# Patient Record
Sex: FEMALE | Race: BLACK OR AFRICAN AMERICAN | Employment: FULL TIME | URBAN - METROPOLITAN AREA
[De-identification: names, ages, dates, MRNs, and addresses within clinical notes are randomized per-mention and may not be internally consistent; named-entity substitution may affect disease eponyms.]

---

## 2018-10-18 ENCOUNTER — HOSPITAL ENCOUNTER (INPATIENT)
Age: 28
LOS: 1 days | Discharge: ANOTHER HOSPITAL | DRG: 446 | End: 2018-10-18
Admitting: HOSPITALIST
Payer: SELF-PAY

## 2018-10-18 ENCOUNTER — HOSPITAL ENCOUNTER (INPATIENT)
Age: 28
LOS: 1 days | Discharge: HOME | DRG: 446 | End: 2018-10-18
Attending: FAMILY MEDICINE | Admitting: FAMILY MEDICINE
Payer: SELF-PAY

## 2018-10-18 ENCOUNTER — APPOINTMENT (OUTPATIENT)
Dept: ULTRASOUND IMAGING | Age: 28
DRG: 446 | End: 2018-10-18
Payer: SELF-PAY

## 2018-10-18 VITALS
OXYGEN SATURATION: 96 % | RESPIRATION RATE: 15 BRPM | TEMPERATURE: 98.2 F | DIASTOLIC BLOOD PRESSURE: 68 MMHG | SYSTOLIC BLOOD PRESSURE: 112 MMHG | HEART RATE: 99 BPM

## 2018-10-18 VITALS
TEMPERATURE: 97 F | SYSTOLIC BLOOD PRESSURE: 151 MMHG | RESPIRATION RATE: 22 BRPM | DIASTOLIC BLOOD PRESSURE: 98 MMHG | OXYGEN SATURATION: 99 % | HEART RATE: 80 BPM

## 2018-10-18 DIAGNOSIS — K80.00 ACUTE CHOLECYSTITIS DUE TO BILIARY CALCULUS: Primary | ICD-10-CM

## 2018-10-18 PROBLEM — R10.9 ABDOMINAL PAIN: Status: ACTIVE | Noted: 2018-10-18

## 2018-10-18 LAB
ALBUMIN SERPL-MCNC: 4.1 G/DL (ref 3.5–5)
ALBUMIN/GLOB SERPL: 1.1 {RATIO}
ALP SERPL-CCNC: 193 U/L (ref 50–136)
ALT SERPL-CCNC: 178 U/L (ref 12–65)
ANION GAP SERPL CALC-SCNC: 5 MMOL/L
AST SERPL-CCNC: 285 U/L (ref 15–37)
BACTERIA URNS QL MICRO: NORMAL /HPF
BASOPHILS # BLD: 0 K/UL (ref 0–0.2)
BASOPHILS NFR BLD: 0 % (ref 0–2)
BILIRUB SERPL-MCNC: 2.5 MG/DL (ref 0.2–1.1)
BUN SERPL-MCNC: 10 MG/DL (ref 6–23)
CALCIUM SERPL-MCNC: 8.9 MG/DL (ref 8.3–10.4)
CASTS URNS QL MICRO: NORMAL /LPF
CHLORIDE SERPL-SCNC: 102 MMOL/L (ref 98–107)
CO2 SERPL-SCNC: 29 MMOL/L (ref 21–32)
CREAT SERPL-MCNC: 1.05 MG/DL (ref 0.6–1)
DIFFERENTIAL METHOD BLD: ABNORMAL
EOSINOPHIL # BLD: 0.2 K/UL (ref 0–0.8)
EOSINOPHIL NFR BLD: 1 % (ref 0.5–7.8)
EPI CELLS #/AREA URNS HPF: NORMAL /HPF
ERYTHROCYTE [DISTWIDTH] IN BLOOD BY AUTOMATED COUNT: 13.6 %
GLOBULIN SER CALC-MCNC: 3.8 G/DL (ref 2.3–3.5)
GLUCOSE SERPL-MCNC: 101 MG/DL (ref 65–100)
HCG UR QL: NEGATIVE
HCT VFR BLD AUTO: 41.7 % (ref 35.8–46.3)
HGB BLD-MCNC: 13.3 G/DL (ref 11.7–15.4)
IMM GRANULOCYTES # BLD: 0 K/UL (ref 0–0.5)
IMM GRANULOCYTES NFR BLD AUTO: 0 % (ref 0–5)
LIPASE SERPL-CCNC: 230 U/L (ref 73–393)
LYMPHOCYTES # BLD: 1.4 K/UL (ref 0.5–4.6)
LYMPHOCYTES NFR BLD: 13 % (ref 13–44)
MCH RBC QN AUTO: 27 PG (ref 26.1–32.9)
MCHC RBC AUTO-ENTMCNC: 31.9 G/DL (ref 31.4–35)
MCV RBC AUTO: 84.6 FL (ref 79.6–97.8)
MONOCYTES # BLD: 0.8 K/UL (ref 0.1–1.3)
MONOCYTES NFR BLD: 7 % (ref 4–12)
NEUTS SEG # BLD: 8.3 K/UL (ref 1.7–8.2)
NEUTS SEG NFR BLD: 78 % (ref 43–78)
NRBC # BLD: 0 K/UL (ref 0–0.2)
PLATELET # BLD AUTO: 243 K/UL (ref 150–450)
PMV BLD AUTO: 10.6 FL (ref 9.4–12.3)
POTASSIUM SERPL-SCNC: 3.7 MMOL/L (ref 3.5–5.1)
PROT SERPL-MCNC: 7.9 G/DL
RBC # BLD AUTO: 4.93 M/UL (ref 4.05–5.2)
RBC #/AREA URNS HPF: NORMAL /HPF
SODIUM SERPL-SCNC: 136 MMOL/L (ref 136–145)
WBC # BLD AUTO: 10.6 K/UL (ref 4.3–11.1)
WBC URNS QL MICRO: NORMAL /HPF

## 2018-10-18 PROCEDURE — 83690 ASSAY OF LIPASE: CPT

## 2018-10-18 PROCEDURE — 81025 URINE PREGNANCY TEST: CPT

## 2018-10-18 PROCEDURE — 85025 COMPLETE CBC W/AUTO DIFF WBC: CPT

## 2018-10-18 PROCEDURE — 99283 EMERGENCY DEPT VISIT LOW MDM: CPT

## 2018-10-18 PROCEDURE — 74011000258 HC RX REV CODE- 258

## 2018-10-18 PROCEDURE — 77030020254 HC SOL INJ D5LR LACTATED RINGER

## 2018-10-18 PROCEDURE — C9113 INJ PANTOPRAZOLE SODIUM, VIA: HCPCS | Performed by: HOSPITALIST

## 2018-10-18 PROCEDURE — 81015 MICROSCOPIC EXAM OF URINE: CPT

## 2018-10-18 PROCEDURE — 96375 TX/PRO/DX INJ NEW DRUG ADDON: CPT

## 2018-10-18 PROCEDURE — 65270000029 HC RM PRIVATE

## 2018-10-18 PROCEDURE — 74011000250 HC RX REV CODE- 250: Performed by: HOSPITALIST

## 2018-10-18 PROCEDURE — 80053 COMPREHEN METABOLIC PANEL: CPT

## 2018-10-18 PROCEDURE — 76705 ECHO EXAM OF ABDOMEN: CPT

## 2018-10-18 PROCEDURE — 96365 THER/PROPH/DIAG IV INF INIT: CPT

## 2018-10-18 PROCEDURE — 74011000258 HC RX REV CODE- 258: Performed by: HOSPITALIST

## 2018-10-18 PROCEDURE — 94760 N-INVAS EAR/PLS OXIMETRY 1: CPT

## 2018-10-18 PROCEDURE — 81003 URINALYSIS AUTO W/O SCOPE: CPT

## 2018-10-18 PROCEDURE — 74011250636 HC RX REV CODE- 250/636

## 2018-10-18 PROCEDURE — 74011250636 HC RX REV CODE- 250/636: Performed by: HOSPITALIST

## 2018-10-18 RX ORDER — METOCLOPRAMIDE HYDROCHLORIDE 5 MG/ML
5 INJECTION INTRAMUSCULAR; INTRAVENOUS EVERY 6 HOURS
Status: DISCONTINUED | OUTPATIENT
Start: 2018-10-18 | End: 2018-10-19 | Stop reason: HOSPADM

## 2018-10-18 RX ORDER — ACETAMINOPHEN 325 MG/1
650 TABLET ORAL
Status: CANCELLED | OUTPATIENT
Start: 2018-10-18

## 2018-10-18 RX ORDER — ACETAMINOPHEN 325 MG/1
650 TABLET ORAL
Status: DISCONTINUED | OUTPATIENT
Start: 2018-10-18 | End: 2018-10-18 | Stop reason: HOSPADM

## 2018-10-18 RX ORDER — SODIUM CHLORIDE 0.9 % (FLUSH) 0.9 %
5-10 SYRINGE (ML) INJECTION AS NEEDED
Status: CANCELLED | OUTPATIENT
Start: 2018-10-18

## 2018-10-18 RX ORDER — SODIUM CHLORIDE 0.9 % (FLUSH) 0.9 %
5-10 SYRINGE (ML) INJECTION EVERY 8 HOURS
Status: DISCONTINUED | OUTPATIENT
Start: 2018-10-18 | End: 2018-10-18 | Stop reason: HOSPADM

## 2018-10-18 RX ORDER — ADHESIVE BANDAGE
30 BANDAGE TOPICAL DAILY PRN
Status: DISCONTINUED | OUTPATIENT
Start: 2018-10-18 | End: 2018-10-19 | Stop reason: HOSPADM

## 2018-10-18 RX ORDER — ADHESIVE BANDAGE
30 BANDAGE TOPICAL DAILY PRN
Status: CANCELLED | OUTPATIENT
Start: 2018-10-18

## 2018-10-18 RX ORDER — SODIUM CHLORIDE 0.9 % (FLUSH) 0.9 %
5-10 SYRINGE (ML) INJECTION AS NEEDED
Status: DISCONTINUED | OUTPATIENT
Start: 2018-10-18 | End: 2018-10-18 | Stop reason: HOSPADM

## 2018-10-18 RX ORDER — HYDROCODONE BITARTRATE AND ACETAMINOPHEN 7.5; 325 MG/1; MG/1
1 TABLET ORAL
Status: DISCONTINUED | OUTPATIENT
Start: 2018-10-18 | End: 2018-10-19 | Stop reason: HOSPADM

## 2018-10-18 RX ORDER — SODIUM CHLORIDE 0.9 % (FLUSH) 0.9 %
5-10 SYRINGE (ML) INJECTION EVERY 8 HOURS
Status: CANCELLED | OUTPATIENT
Start: 2018-10-18

## 2018-10-18 RX ORDER — METOCLOPRAMIDE HYDROCHLORIDE 5 MG/ML
5 INJECTION INTRAMUSCULAR; INTRAVENOUS EVERY 6 HOURS
Status: CANCELLED | OUTPATIENT
Start: 2018-10-18

## 2018-10-18 RX ORDER — HYDROMORPHONE HYDROCHLORIDE 2 MG/ML
0.5 INJECTION, SOLUTION INTRAMUSCULAR; INTRAVENOUS; SUBCUTANEOUS
Status: DISCONTINUED | OUTPATIENT
Start: 2018-10-18 | End: 2018-10-18 | Stop reason: HOSPADM

## 2018-10-18 RX ORDER — ONDANSETRON 2 MG/ML
4 INJECTION INTRAMUSCULAR; INTRAVENOUS
Status: DISCONTINUED | OUTPATIENT
Start: 2018-10-18 | End: 2018-10-18 | Stop reason: HOSPADM

## 2018-10-18 RX ORDER — HYDROCODONE BITARTRATE AND ACETAMINOPHEN 7.5; 325 MG/1; MG/1
1 TABLET ORAL
Status: CANCELLED | OUTPATIENT
Start: 2018-10-18

## 2018-10-18 RX ORDER — DEXTROSE, SODIUM CHLORIDE, SODIUM LACTATE, POTASSIUM CHLORIDE, AND CALCIUM CHLORIDE 5; .6; .31; .03; .02 G/100ML; G/100ML; G/100ML; G/100ML; G/100ML
125 INJECTION, SOLUTION INTRAVENOUS CONTINUOUS
Status: CANCELLED | OUTPATIENT
Start: 2018-10-18 | End: 2018-10-20

## 2018-10-18 RX ORDER — HYDROMORPHONE HYDROCHLORIDE 1 MG/ML
0.5 INJECTION, SOLUTION INTRAMUSCULAR; INTRAVENOUS; SUBCUTANEOUS
Status: DISCONTINUED | OUTPATIENT
Start: 2018-10-18 | End: 2018-10-19 | Stop reason: HOSPADM

## 2018-10-18 RX ORDER — NALOXONE HYDROCHLORIDE 0.4 MG/ML
0.4 INJECTION, SOLUTION INTRAMUSCULAR; INTRAVENOUS; SUBCUTANEOUS AS NEEDED
Status: DISCONTINUED | OUTPATIENT
Start: 2018-10-18 | End: 2018-10-18 | Stop reason: HOSPADM

## 2018-10-18 RX ORDER — ONDANSETRON 2 MG/ML
4 INJECTION INTRAMUSCULAR; INTRAVENOUS
Status: CANCELLED | OUTPATIENT
Start: 2018-10-18

## 2018-10-18 RX ORDER — HYDRALAZINE HYDROCHLORIDE 20 MG/ML
20 INJECTION INTRAMUSCULAR; INTRAVENOUS
Status: DISCONTINUED | OUTPATIENT
Start: 2018-10-18 | End: 2018-10-18 | Stop reason: HOSPADM

## 2018-10-18 RX ORDER — ONDANSETRON 2 MG/ML
4 INJECTION INTRAMUSCULAR; INTRAVENOUS
Status: DISCONTINUED | OUTPATIENT
Start: 2018-10-18 | End: 2018-10-19 | Stop reason: HOSPADM

## 2018-10-18 RX ORDER — HYDROCODONE BITARTRATE AND ACETAMINOPHEN 7.5; 325 MG/1; MG/1
1 TABLET ORAL
Qty: 15 TAB | Refills: 0 | Status: SHIPPED | OUTPATIENT
Start: 2018-10-18

## 2018-10-18 RX ORDER — ENOXAPARIN SODIUM 100 MG/ML
40 INJECTION SUBCUTANEOUS EVERY 24 HOURS
Status: DISCONTINUED | OUTPATIENT
Start: 2018-10-18 | End: 2018-10-18 | Stop reason: HOSPADM

## 2018-10-18 RX ORDER — SODIUM CHLORIDE 0.9 % (FLUSH) 0.9 %
5-10 SYRINGE (ML) INJECTION EVERY 8 HOURS
Status: DISCONTINUED | OUTPATIENT
Start: 2018-10-18 | End: 2018-10-19 | Stop reason: HOSPADM

## 2018-10-18 RX ORDER — ENOXAPARIN SODIUM 100 MG/ML
40 INJECTION SUBCUTANEOUS EVERY 24 HOURS
Status: DISCONTINUED | OUTPATIENT
Start: 2018-10-19 | End: 2018-10-19 | Stop reason: HOSPADM

## 2018-10-18 RX ORDER — HYDROMORPHONE HYDROCHLORIDE 2 MG/ML
0.5 INJECTION, SOLUTION INTRAMUSCULAR; INTRAVENOUS; SUBCUTANEOUS
Status: CANCELLED | OUTPATIENT
Start: 2018-10-18

## 2018-10-18 RX ORDER — SODIUM CHLORIDE 0.9 % (FLUSH) 0.9 %
5-10 SYRINGE (ML) INJECTION AS NEEDED
Status: DISCONTINUED | OUTPATIENT
Start: 2018-10-18 | End: 2018-10-19 | Stop reason: HOSPADM

## 2018-10-18 RX ORDER — DEXTROSE, SODIUM CHLORIDE, SODIUM LACTATE, POTASSIUM CHLORIDE, AND CALCIUM CHLORIDE 5; .6; .31; .03; .02 G/100ML; G/100ML; G/100ML; G/100ML; G/100ML
125 INJECTION, SOLUTION INTRAVENOUS CONTINUOUS
Status: DISCONTINUED | OUTPATIENT
Start: 2018-10-18 | End: 2018-10-19 | Stop reason: HOSPADM

## 2018-10-18 RX ORDER — NALOXONE HYDROCHLORIDE 0.4 MG/ML
0.4 INJECTION, SOLUTION INTRAMUSCULAR; INTRAVENOUS; SUBCUTANEOUS AS NEEDED
Status: CANCELLED | OUTPATIENT
Start: 2018-10-18

## 2018-10-18 RX ORDER — ENOXAPARIN SODIUM 100 MG/ML
40 INJECTION SUBCUTANEOUS EVERY 24 HOURS
Status: CANCELLED | OUTPATIENT
Start: 2018-10-19

## 2018-10-18 RX ORDER — METOCLOPRAMIDE HYDROCHLORIDE 5 MG/ML
5 INJECTION INTRAMUSCULAR; INTRAVENOUS EVERY 6 HOURS
Status: DISCONTINUED | OUTPATIENT
Start: 2018-10-18 | End: 2018-10-18 | Stop reason: HOSPADM

## 2018-10-18 RX ORDER — NALOXONE HYDROCHLORIDE 0.4 MG/ML
0.4 INJECTION, SOLUTION INTRAMUSCULAR; INTRAVENOUS; SUBCUTANEOUS AS NEEDED
Status: DISCONTINUED | OUTPATIENT
Start: 2018-10-18 | End: 2018-10-19 | Stop reason: HOSPADM

## 2018-10-18 RX ORDER — ADHESIVE BANDAGE
30 BANDAGE TOPICAL DAILY PRN
Status: DISCONTINUED | OUTPATIENT
Start: 2018-10-18 | End: 2018-10-18 | Stop reason: HOSPADM

## 2018-10-18 RX ORDER — HYDROCODONE BITARTRATE AND ACETAMINOPHEN 7.5; 325 MG/1; MG/1
1 TABLET ORAL
Status: DISCONTINUED | OUTPATIENT
Start: 2018-10-18 | End: 2018-10-18 | Stop reason: HOSPADM

## 2018-10-18 RX ORDER — DEXTROSE, SODIUM CHLORIDE, SODIUM LACTATE, POTASSIUM CHLORIDE, AND CALCIUM CHLORIDE 5; .6; .31; .03; .02 G/100ML; G/100ML; G/100ML; G/100ML; G/100ML
125 INJECTION, SOLUTION INTRAVENOUS CONTINUOUS
Status: DISCONTINUED | OUTPATIENT
Start: 2018-10-18 | End: 2018-10-18 | Stop reason: HOSPADM

## 2018-10-18 RX ORDER — ACETAMINOPHEN 325 MG/1
650 TABLET ORAL
Status: DISCONTINUED | OUTPATIENT
Start: 2018-10-18 | End: 2018-10-19 | Stop reason: HOSPADM

## 2018-10-18 RX ORDER — ONDANSETRON 2 MG/ML
4 INJECTION INTRAMUSCULAR; INTRAVENOUS
Status: COMPLETED | OUTPATIENT
Start: 2018-10-18 | End: 2018-10-18

## 2018-10-18 RX ADMIN — PIPERACILLIN SODIUM,TAZOBACTAM SODIUM 3.38 G: 3; .375 INJECTION, POWDER, FOR SOLUTION INTRAVENOUS at 13:09

## 2018-10-18 RX ADMIN — ONDANSETRON 4 MG: 2 INJECTION INTRAMUSCULAR; INTRAVENOUS at 03:51

## 2018-10-18 RX ADMIN — SODIUM CHLORIDE, SODIUM LACTATE, POTASSIUM CHLORIDE, CALCIUM CHLORIDE, AND DEXTROSE MONOHYDRATE 125 ML/HR: 600; 310; 30; 20; 5 INJECTION, SOLUTION INTRAVENOUS at 13:09

## 2018-10-18 RX ADMIN — SODIUM CHLORIDE 40 MG: 9 INJECTION INTRAMUSCULAR; INTRAVENOUS; SUBCUTANEOUS at 09:29

## 2018-10-18 RX ADMIN — PIPERACILLIN SODIUM,TAZOBACTAM SODIUM 4.5 G: 4; .5 INJECTION, POWDER, FOR SOLUTION INTRAVENOUS at 04:50

## 2018-10-18 RX ADMIN — METOCLOPRAMIDE 5 MG: 5 INJECTION, SOLUTION INTRAMUSCULAR; INTRAVENOUS at 12:07

## 2018-10-18 RX ADMIN — Medication 10 ML: at 13:14

## 2018-10-18 NOTE — PROGRESS NOTES
Problem: Falls - Risk of 
Goal: *Absence of Falls Document Marii Blankenship Fall Risk and appropriate interventions in the flowsheet. Outcome: Progressing Towards Goal 
Fall Risk Interventions: 
  
 
  
 
Medication Interventions: Patient to call before getting OOB, Teach patient to arise slowly

## 2018-10-18 NOTE — CONSULTS
[facility] Consult Note       Primary GI Physician: Dr. Loretta Abdullahi    Referring Physician:  Dr. Christine Madrigal    Consult Date:  10/18/2018    Admit Date:  10/18/2018    Chief Complaint:  Abdominal pain and gallstones    Subjective:     History of Present Illness:  Patient is a 34 y.o. female with no significant PMH, who is seen in consultation at the request of Dr. Christine Madrigal for abdominal pain and gallstones. Pt presents with recurrent post prandial upper abdominal pain. She states that pain is preceded by dyspepsia but she does not vomit. She has had several episodes within the last 10 months but pain is typically short-lived. She had an ultrasound earlier this year at an ER in [facility] and was told she just had sludge. She states dyspepsia started around 3 or 4p yesterday and was followed by more severe pain that lasted longer. She has elevated LFTs Tbili 2.5, , , Alk phos 193. US shows thickened gallbladder with stones and normal CBD (3.7mm) w/o  Intrahepatic ductal dilation. She had heartburn with her pregnancy last year but does not routinely have symptoms of reflux. She reports normal bowel habits. Patient does not have a PCP as she has just moved to the area in the last several months. PMH:  No past medical history on file.     PSH:   section 2017    Allergies:  No Known Allergies    Home Medications:  Prior to Admission medications    Not on [facility] Medications:  Current Facility-Administered Medications   Medication Dose Route Frequency    acetaminophen (TYLENOL) tablet 650 mg  650 mg Oral Q6H PRN    HYDROcodone-acetaminophen (NORCO) 7.5-325 mg per tablet 1 Tab  1 Tab Oral Q6H PRN    HYDROmorphone (PF) (DILAUDID) injection 0.5 mg  0.5 mg IntraVENous Q4H PRN    magnesium hydroxide (MILK OF MAGNESIA) 400 mg/5 mL oral suspension 30 mL  30 mL Oral DAILY PRN    naloxone (NARCAN) injection 0.4 mg  0.4 mg IntraVENous PRN    ondansetron (ZOFRAN) injection 4 mg  4 mg IntraVENous Q4H PRN    sodium chloride (NS) flush 5-10 mL  5-10 mL IntraVENous Q8H    sodium chloride (NS) flush 5-10 mL  5-10 mL IntraVENous PRN    [START ON 10/19/2018] enoxaparin (LOVENOX) injection 40 mg  40 mg SubCUTAneous Q24H    dextrose 5% lactated ringers infusion  125 mL/hr IntraVENous CONTINUOUS    metoclopramide HCl (REGLAN) injection 5 mg  5 mg IntraVENous Q6H    pantoprazole (PROTONIX) 40 mg in sodium chloride 0.9% 10 mL injection  40 mg IntraVENous Q12H    piperacillin-tazobactam (ZOSYN) 3.375 g in 0.9% sodium chloride (MBP/ADV) 100 mL  3.375 g IntraVENous Q8H       Social History:  Social History     Tobacco Use    Smoking status: Not on file   Substance Use Topics    Alcohol use: Yes       Pt denies any history of drug use, blood transfusions, or tattoos. Family History:  No family history on file. Review of Systems:  A detailed 10 system ROS is obtained, with pertinent positives as listed above. All others are negative. Diet:  NPO    Objective:     Physical Exam:  Vitals:  Visit Vitals  /77 (BP 1 Location: Left arm, BP Patient Position: Head of bed elevated (Comment degrees))   Pulse 99   Temp 98.1 °F (36.7 °C)   Resp 15   LMP 10/07/2018   SpO2 97%     Gen:  Pt is alert, cooperative, no acute distress  Skin:  Extremities and face reveal no rashes. HEENT: Sclerae anicteric. Extra-occular muscles are intact. No oral ulcers. No abnormal pigmentation of the lips. The neck is supple. Cardiovascular: Regular rate and rhythm. No murmurs, gallops, or rubs. Respiratory:  Comfortable breathing with no accessory muscle use. Clear breath sounds anteriorly with no wheezes, rales, or rhonchi. GI:  Abdomen nondistended, soft, and non-tender at this time. Normal active bowel sounds. No enlargement of the liver or spleen. No masses palpable. Rectal:  Deferred  Musculoskeletal:  No pitting edema of the lower legs. Neurological:  Gross memory appears intact.   Patient is alert and oriented. Psychiatric:  Mood appears appropriate with judgement intact. Lymphatic:  No cervical or supraclavicular adenopathy. Laboratory:    Recent Labs     10/18/18  0345   WBC 10.6   HGB 13.3   HCT 41.7      MCV 84.6      K 3.7      CO2 29   BUN 10   CREA 1.05*   CA 8.9   *   *   SGOT 285*   *   TBILI 2.5*   ALB 4.1   TP 7.9   LPSE 230          Assessment:     Active Problems:    Abdominal pain (10/18/2018)    35 yo with abdominal pain with acute cholecystitis associated with elevated LFTs with US evidence of normal CBD and no ductal dilation. Plan:     - recommend cholecystectomy with IOC. - we will be available for ERCP if IOC is positive. - recommend follow up after discharge to ensure LFTs normalize (this could be with surgery at her post-op visit since she has no PCP)    MARY KAY Oshea      Patient is seen and examined in collaboration with Dr. Tl Sierra. Assessment and plan as per Dr. Tl Sierra.

## 2018-10-18 NOTE — PROGRESS NOTES
10/18/18 0815 Dual Skin Pressure Injury Assessment Dual Skin Pressure Injury Assessment [facility] Second Care Provider (Based on [address] [place] [address]) Brooke Greenberg RN Dual skin assessment completed with Brooke Greenberg RN. No breakdown noted. Patient has some tattoo's. Patient oriented to the room and the use of the call light with verbal and demonstrated feedback. Bed in the low locked position and the call light is within reach. Will continue to monitor patient.

## 2018-10-18 NOTE — CONSULTS
H&P/Consult Note/Progress Note/Office Note:   Yasemin Hale  MRN: [id number]  LENNOX:[birth year]  Age:28 y.o.    HPI: Yasemin Hale is a 34 y.o. female who we are asked by Dr. Prateek Moore to see for acute calculous cholecystitis. The patient recently moved here from [facility] where she had been seen for gallbladder sludge. She was suppose to see a surgeon there but never followed up as she was in the process of moving. She presented to the ER early this morning with complaints of right upper quadrant pain. She has elevated LFTs Tbili 2.5, , , Alk phos 193. US shows thickened gallbladder with stones and normal CBD. She was sent downtown from [facility] for possible ERCP. Dr. Perez Alejandro evaluated her and is recommending a cholecystectomy with intraoperative cholangiogram. She has a normal white count. AF, VSS. She is currently pain free. She has a 9 month old and just moved to the area. She is needing to arrange  prior to surgery. No past medical history on file. No past surgical history on file.   Current Facility-Administered Medications   Medication Dose Route Frequency    acetaminophen (TYLENOL) tablet 650 mg  650 mg Oral Q6H PRN    HYDROcodone-acetaminophen (NORCO) 7.5-325 mg per tablet 1 Tab  1 Tab Oral Q6H PRN    HYDROmorphone (PF) (DILAUDID) injection 0.5 mg  0.5 mg IntraVENous Q4H PRN    magnesium hydroxide (MILK OF MAGNESIA) 400 mg/5 mL oral suspension 30 mL  30 mL Oral DAILY PRN    naloxone (NARCAN) injection 0.4 mg  0.4 mg IntraVENous PRN    ondansetron (ZOFRAN) injection 4 mg  4 mg IntraVENous Q4H PRN    sodium chloride (NS) flush 5-10 mL  5-10 mL IntraVENous Q8H    sodium chloride (NS) flush 5-10 mL  5-10 mL IntraVENous PRN    [START ON 10/19/2018] enoxaparin (LOVENOX) injection 40 mg  40 mg SubCUTAneous Q24H    dextrose 5% lactated ringers infusion  125 mL/hr IntraVENous CONTINUOUS    metoclopramide HCl (REGLAN) injection 5 mg  5 mg IntraVENous Q6H    pantoprazole (PROTONIX) 40 mg in sodium chloride 0.9% 10 mL injection  40 mg IntraVENous Q12H    piperacillin-tazobactam (ZOSYN) 3.375 g in 0.9% sodium chloride (MBP/ADV) 100 mL  3.375 g IntraVENous Q8H     Patient has no known allergies. Social History     Socioeconomic History    Marital status: SINGLE     Spouse name: Not on file    Number of children: Not on file    Years of education: Not on file    Highest education level: Not on file   Social Needs    Financial resource strain: Not on file    Food insecurity - worry: Not on file    Food insecurity - inability: Not on file    Transportation needs - medical: Not on file   [employer] needs - non-medical: Not on file   Occupational History    Not on file   Tobacco Use    Smoking status: Not on file   Substance and Sexual Activity    Alcohol use: Yes    Drug use: Not on file    Sexual activity: Not on file   Other Topics Concern    Not on file   Social History Narrative    Not on file     Social History     Tobacco Use   Smoking Status Not on file     No family history on file. ROS: The patient has no difficulty with chest pain or shortness of breath. No fever or chills. Comprehensive review of systems was otherwise unremarkable except as noted above. Physical Exam:   Visit Vitals  /77 (BP 1 Location: Left arm, BP Patient Position: Head of bed elevated (Comment degrees))   Pulse 99   Temp 98.1 °F (36.7 °C)   Resp 15   LMP 10/07/2018   SpO2 97%     Constitutional: Alert, oriented, cooperative patient in no acute distress; appears stated age    Eyes:Sclera are clear. EOMs intact  ENMT: no external lesions gross hearing normal; no obvious neck masses, no ear or lip lesions, nares normal  CV: RRR. Normal perfusion  Resp: No JVD. Breathing is  non-labored; no audible wheezing. CTAB. GI: soft and non-distended, non tender, -powers's, no rebound, no guarding    Musculoskeletal: unremarkable with normal function.  No embolic signs or cyanosis. Neuro:  Oriented; moves all 4; no focal deficits  Psychiatric: normal affect and mood, no memory impairment    Recent vitals (if inpt):  Patient Vitals for the past 24 hrs:   BP Temp Pulse Resp SpO2   10/18/18 1135 116/77 98.1 °F (36.7 °C) 99 15 97 %   10/18/18 0805 117/76 97.5 °F (36.4 °C) 80 20 99 %       Labs:  Recent Labs     10/18/18  0345   WBC 10.6   HGB 13.3         K 3.7      CO2 29   BUN 10   CREA 1.05*   *   TBILI 2.5*   SGOT 285*   *   *   LPSE 230       Lab Results   Component Value Date/Time    WBC 10.6 10/18/2018 03:45 AM    HGB 13.3 10/18/2018 03:45 AM    PLATELET 960 78/68/6068 03:45 AM    Sodium 136 10/18/2018 03:45 AM    Potassium 3.7 10/18/2018 03:45 AM    Chloride 102 10/18/2018 03:45 AM    CO2 29 10/18/2018 03:45 AM    BUN 10 10/18/2018 03:45 AM    Creatinine 1.05 (H) 10/18/2018 03:45 AM    Glucose 101 (H) 10/18/2018 03:45 AM    Bilirubin, total 2.5 (H) 10/18/2018 03:45 AM    AST (SGOT) 285 (H) 10/18/2018 03:45 AM    ALT (SGPT) 178 (H) 10/18/2018 03:45 AM    Alk. phosphatase 193 (H) 10/18/2018 03:45 AM    Lipase 230 10/18/2018 03:45 AM       I reviewed recent labs and recent radiologic studies. US Results (most recent):  Results from [place] encounter on 10/18/18   US ABD LTD    Narrative RIGHT UPPER QUADRANT ULTRASOUND. HISTORY: Right upper quadrant abdominal pain. COMPARISON: None    FINDINGS: The ultrasonographic Murdock's sign is reported as  negative No  gallstones. The gallbladder wall is focally thickened. The common bile duct is  not dilated, 3.7 mm. Intrahepatic biliary tree is not dilated. Included portion  of the pancreas and right kidney are unremarkable. The liver is unremarkable. Impression IMPRESSION: Multiple gallstones in a focally thickened wall. Gallbladder wall  edema. Consider acute calculus cholecystitis.                I independently reviewed radiology images for studies I described above or studies I have ordered.    Admission date (for inpatients): 10/18/2018   * No surgery found *  * No surgery found *    ASSESSMENT/PLAN:  Problem List  Never Reviewed          Codes Class Noted    Abdominal pain ICD-10-CM: R10.9  ICD-9-CM: 789.00  10/18/2018        Acute cholecystitis due to biliary calculus ICD-10-CM: K80.00  ICD-9-CM: 574.00  10/18/2018            Active Problems:    Abdominal pain (10/18/2018)       Continue care per primary  No need for emergent cholecystectomy  Will see patient in the office 10/25/18 @ 9:15am with Dr. Andrew Younger after discharge to schedule cholecystectomy      Signed:  Wayne Vigil NP

## 2018-10-18 NOTE — ROUTINE PROCESS
TRANSFER - OUT REPORT: 
 
Verbal report given to Pito RN (name) on Toya Duval  being transferred to Med Surg SFD(unit) for routine progression of care Report consisted of patients Situation, Background, Assessment and  
Recommendations(SBAR). Information from the following report(s) SBAR, ED Summary, [facility] and Recent Results was reviewed with the receiving nurse. Lines:  
Peripheral IV 10/18/18 Anterior; Inferior; Lower;Proximal;Right Arm (Active) Opportunity for questions and clarification was provided. Patient transported with: 
 Chandrakant Up

## 2018-10-18 NOTE — DISCHARGE SUMMARY
Hospitalist Discharge Summary     Patient ID:  Yasemin Hale  [id number]  47 y.o.  1990  Admit date: 10/18/2018  2:27 AM  Discharge date and time: 10/18/2018  Attending: No att. providers found  PCP:  Brittani Farmer, Not On File  Treatment Team: Consulting Provider: Ana Olmos MD; Primary Nurse: Harris Martino RN    Principal Diagnosis <principal problem not specified>   Active Problems:    Abdominal pain (10/18/2018)      Acute cholecystitis due to biliary calculus (10/18/2018)             Hospital Course:  Please refer to the admission H&P for details of presentation. In summary, the patient is a 33 y/o female with no significant pmhx presented with right upper quadrant pain for 1 day. Pt reported that abdominal pain is intermittent, comes and goes, 10/10 in severity, stabbing, non radiating a/w nausea/vomiting. As per the pt, she has been informed that she has GB sludge, and advised to f/u with surgery for gallbladder removal.  Pt denies chest pain, SOB, fever, chills, melena, hematochezia. Abdominal pain gets worse after a large or fatty meal, gets better with pain meds. In ER, USG abdomen showed multiple gallstones with GB edema with suspicion for acute calculus cholecystitis. CMP showed elevation in liver enzymes. Pt is being transferred to [facility] for GI eval, ERCP.       Significant Diagnostic Studies:   US ABD LTD   Final Result   IMPRESSION: Multiple gallstones in a focally thickened wall. Gallbladder wall   edema. Consider acute calculus cholecystitis.                           Labs: Results:       Chemistry Recent Labs     10/18/18  0345   *      K 3.7      CO2 29   BUN 10   CREA 1.05*   CA 8.9   AGAP 5   *   TP 7.9   ALB 4.1   GLOB 3.8*   AGRAT 1.1      CBC w/Diff Recent Labs     10/18/18  0345   WBC 10.6   RBC 4.93   HGB 13.3   HCT 41.7      GRANS 78   LYMPH 13   EOS 1      Cardiac Enzymes No results for input(s): CPK, CKND1, KATARINA in the last 72 hours. No lab exists for component: CKRMB, TROIP   Coagulation No results for input(s): PTP, INR, APTT in the last 72 hours. No lab exists for component: INREXT    Lipid Panel No results found for: CHOL, CHOLPOCT, CHOLX, CHLST, CHOLV, 585895, HDL, LDL, LDLC, DLDLP, 218447, VLDLC, VLDL, TGLX, TRIGL, TRIGP, TGLPOCT, CHHD, CHHDX   BNP No results for input(s): BNPP in the last 72 hours. Liver Enzymes Recent Labs     10/18/18  0345   TP 7.9   ALB 4.1   *   SGOT 285*      Thyroid Studies No results found for: T4, T3U, TSH, TSHEXT         Discharge Exam:  Visit Vitals  BP (!) 151/98   Pulse 80   Temp 97 °F (36.1 °C)   Resp 22   LMP 10/07/2018   SpO2 99%     Pt transferred prior to being able to evaluated    Disposition: [facility]  Discharge Condition: stable  Patient Instructions: There are no discharge medications for this patient.       ·     Time spent to discharge patient 35 minutes  Signed:  Euell Najjar, MD  10/18/2018  8:26 AM

## 2018-10-18 NOTE — ED TRIAGE NOTES
Pt c/o abd pain since 1600 states she has episode of pain due to sludge in gallbladder but never last this long

## 2018-10-18 NOTE — ED PROVIDER NOTES
53-year-old female complaining of right upper quadrant epigastric abdominal pain nausea and vomiting. Patient states that she has been told she had sludge in her gallbladder has had several episodes of similar complaint. She was instructed to see a surgeon to get her gallbladder removed but has not made an appointment yet. No past medical history on file. No past surgical history on file. No family history on file. Social History Socioeconomic History  Marital status: SINGLE Spouse name: Not on file  Number of children: Not on file  Years of education: Not on file  Highest education level: Not on file Social Needs  Financial resource strain: Not on file  Food insecurity - worry: Not on file  Food insecurity - inability: Not on file  Transportation needs - medical: Not on file  Transportation needs - non-medical: Not on file Occupational History  Not on file Tobacco Use  Smoking status: Not on file Substance and Sexual Activity  Alcohol use: Yes  Drug use: Not on file  Sexual activity: Not on file Other Topics Concern  Not on file Social History Narrative  Not on file ALLERGIES: Patient has no known allergies. Review of Systems Constitutional: Negative. Negative for activity change. HENT: Negative. Eyes: Negative. Respiratory: Negative. Cardiovascular: Negative. Gastrointestinal: Negative. Genitourinary: Negative. Musculoskeletal: Negative. Skin: Negative. Neurological: Negative. Psychiatric/Behavioral: Negative. All other systems reviewed and are negative. There were no vitals filed for this visit. Physical Exam  
Constitutional: She is oriented to person, place, and time. She appears well-developed and well-nourished. No distress. HENT:  
Head: Normocephalic and atraumatic.   
Right Ear: External ear normal.  
Left Ear: External ear normal.  
Nose: Nose normal.  
 Eyes: Conjunctivae and EOM are normal. Pupils are equal, round, and reactive to light. Right eye exhibits no discharge. Left eye exhibits no discharge. No scleral icterus. Neck: Normal range of motion. Cardiovascular: Regular rhythm. Pulmonary/Chest: Effort normal and breath sounds normal. No stridor. No respiratory distress. She has no wheezes. She has no rales. Abdominal: Soft. Bowel sounds are normal. She exhibits no distension. There is tenderness in the right upper quadrant. There is no rebound. Musculoskeletal: Normal range of motion. Neurological: She is alert and oriented to person, place, and time. She exhibits normal muscle tone. Coordination normal.  
Skin: Skin is warm and dry. No rash noted. Psychiatric: She has a normal mood and affect. Her behavior is normal.  
  
 
MDM Number of Diagnoses or Management Options Diagnosis management comments: Assessment: Biliary colic with gallbladder pain. Plan follow-up surgery for elective cholecystectomy Procedures

## 2018-10-18 NOTE — H&P
HOSPITALIST H&P/CONSULTNAME:  Laurence Morataya Age:  34 y.o. 
:   1990 MRN:   [id number] PCP: [facility], Not On File Consulting MD: Treatment Team: Attending Provider: Valerie Jameson MD; Primary Nurse: Hudson Arrieta 
HPI:  
Patient is a 33 y/o female with no significant pmhx presented with right upper quadrant pain for 1 day. Pt reported that abdominal pain is intermittent, comes and goes, 10/10 in severity, stabbing, non radiating a/w nausea/vomiting. As per the pt, she has been informed that she has GB sludge, and advised to f/u with surgery for gallbladder removal. 
Pt denies chest pain, SOB, fever, chills, melena, hematochezia. Abdominal pain gets worse after a large or fatty meal, gets better with pain meds. In ER, USG abdomen showed multiple gallstones with GB edema with suspicion for acute calculus cholecystitis. CMP showed elevation in liver enzymes. Complete ROS done and is as stated in HPI or otherwise negative No past medical history. No past surgical history. None No Known Allergies Social History Tobacco Use  Smoking status: Not on file Substance Use Topics  Alcohol use: Yes, occasionally No family history on file. Objective:  
 
Visit Vitals BP (!) 169/101 Pulse 89 Temp 97 °F (36.1 °C) Resp 22 LMP 10/07/2018 SpO2 99% Temp (24hrs), Av °F (36.1 °C), Min:97 °F (36.1 °C), Max:97 °F (36.1 °C) Oxygen Therapy O2 Sat (%): 99 % (10/18/18 0225) O2 Device: Room air (10/18/18 0225) Physical Exam: 
General:    Alert, cooperative, mild distress, appears stated age. Head:   Normocephalic, without obvious abnormality, atraumatic. Nose:  Nares normal. No drainage or sinus tenderness. Lungs:   Clear to auscultation bilaterally. No Wheezing or Rhonchi. No rales. Heart:   Regular rate and rhythm,  no murmur, rub or gallop. Abdomen:   Soft, RUQ tenderness++, guarding, no rigidity, BS++ Extremities: No cyanosis. No edema. No clubbing Skin:     Texture, turgor normal. No rashes or lesions. Not Jaundiced Neurologic: Alert and oriented x 3, no focal deficits Data Review:  
Recent Results (from the past 24 hour(s)) CBC WITH AUTOMATED DIFF Collection Time: 10/18/18  3:45 AM  
Result Value Ref Range WBC 10.6 4.3 - 11.1 K/uL  
 RBC 4.93 4.05 - 5.2 M/uL  
 HGB 13.3 11.7 - 15.4 g/dL HCT 41.7 35.8 - 46.3 % MCV 84.6 79.6 - 97.8 FL  
 MCH 27.0 26.1 - 32.9 PG  
 MCHC 31.9 31.4 - 35.0 g/dL  
 RDW 13.6 % PLATELET 032 572 - 749 K/uL MPV 10.6 9.4 - 12.3 FL ABSOLUTE NRBC 0.00 0.0 - 0.2 K/uL  
 DF AUTOMATED NEUTROPHILS 78 43 - 78 % LYMPHOCYTES 13 13 - 44 % MONOCYTES 7 4.0 - 12.0 % EOSINOPHILS 1 0.5 - 7.8 % BASOPHILS 0 0.0 - 2.0 % IMMATURE GRANULOCYTES 0 0.0 - 5.0 %  
 ABS. NEUTROPHILS 8.3 (H) 1.7 - 8.2 K/UL  
 ABS. LYMPHOCYTES 1.4 0.5 - 4.6 K/UL  
 ABS. MONOCYTES 0.8 0.1 - 1.3 K/UL  
 ABS. EOSINOPHILS 0.2 0.0 - 0.8 K/UL  
 ABS. BASOPHILS 0.0 0.0 - 0.2 K/UL  
 ABS. IMM. GRANS. 0.0 0.0 - 0.5 K/UL METABOLIC PANEL, COMPREHENSIVE Collection Time: 10/18/18  3:45 AM  
Result Value Ref Range Sodium 136 136 - 145 mmol/L Potassium 3.7 3.5 - 5.1 mmol/L Chloride 102 98 - 107 mmol/L  
 CO2 29 21 - 32 mmol/L Anion gap 5 mmol/L Glucose 101 (H) 65 - 100 mg/dL BUN 10 6 - 23 MG/DL Creatinine 1.05 (H) 0.6 - 1.0 MG/DL  
 GFR est AA >60 >60 ml/min/1.73m2 GFR est non-AA >60 ml/min/1.73m2 Calcium 8.9 8.3 - 10.4 MG/DL Bilirubin, total 2.5 (H) 0.2 - 1.1 MG/DL  
 ALT (SGPT) 178 (H) 12 - 65 U/L  
 AST (SGOT) 285 (H) 15 - 37 U/L Alk. phosphatase 193 (H) 50 - 136 U/L Protein, total 7.9 g/dL Albumin 4.1 3.5 - 5.0 g/dL Globulin 3.8 (H) 2.3 - 3.5 g/dL A-G Ratio 1.1 LIPASE Collection Time: 10/18/18  3:45 AM  
Result Value Ref Range Lipase 230 73 - 393 U/L  
HCG URINE, QL. - POC Collection Time: 10/18/18  3:57 AM  
Result Value Ref Range Pregnancy test,urine (POC) NEGATIVE  NEG    
URINE MICROSCOPIC Collection Time: 10/18/18  4:00 AM  
Result Value Ref Range WBC 20-50 0 /hpf  
 RBC 0-3 0 /hpf Epithelial cells 3-5 0 /hpf Bacteria TRACE 0 /hpf Casts 0-3 0 /lpf Imaging [facility]es /Studies: All diagnostic imaging personally reviewed by me. USG abdomen: 
Cholelithiasis with gallbladder wall thickening concerning for acute calculus cholecystitis. Assessment and Plan: Active Hospital Problems Diagnosis Date Noted  Abdominal pain 10/18/2018  Acute cholecystitis due to biliary calculus 10/18/2018 PLAN 
·  Admit to inpatient for acute cholecystitis and abdominal pain · GI consulted, plan for ERCP today in [facility]. · NPO 
· Protonix IV q12h · Surgery consult, will need cholecystectomy as outpatient · IV reglan for nausea/vomiting · Prn tylenol for mild pain and fever · Prn norco and IV dilaudid for moderate to severe pain · Prn zofran for nausea/vomiting · IV zosyn for empiric coverage for GNR infection · IV fluids d5LR @ 125 cc/hr · DVT prophylaxis with lovenox Code Status: full High risk with opioids on board Anticipated discharge: >2 MN Signed By: Jerica Winters MD   
 October 18, 2018

## 2018-10-18 NOTE — DISCHARGE SUMMARY
HOSPITALIST H&P/CONSULT  NAME:  Imer Ordonez   Age:  34 y.o.  :   1990   MRN:   [id number]  PCP: Genesis Chiu, Not On File  Consulting MD:  Treatment Team: Attending Provider: Jose Umanzor MD  HPI:    Patient is a 33 y/o female with no significant pmhx presented with right upper quadrant pain for 1 day. Pt reported that abdominal pain is intermittent, comes and goes, 10/10 in severity, stabbing, non radiating a/w nausea/vomiting. As per the pt, she has been informed that she has GB sludge, and advised to f/u with surgery for gallbladder removal.  Pt denies chest pain, SOB, fever, chills, melena, hematochezia. Abdominal pain gets worse after a large or fatty meal, gets better with pain meds. In ER, USG abdomen showed multiple gallstones with GB edema with suspicion for acute calculus cholecystitis. CMP showed elevation in liver enzymes. Pt is being transferred to [facility] for GI eval, ERCP. Complete ROS done and is as stated in HPI or otherwise negative  No past medical history on file. No past surgical history on file. None     No Known Allergies   Social History     Tobacco Use    Smoking status: Not on file   Substance Use Topics    Alcohol use: Yes      No family history on file. Objective:     Patient Vitals for the past 24 hrs:   Temp Pulse Resp BP SpO2   10/18/18 0805 97.5 °F (36.4 °C) 80 20 117/76 99 %     Oxygen Therapy  O2 Sat (%): 99 % (10/18/18 0805)  Physical Exam:  Pt had already transferred prior to me being able to evaluate.   Data Review:   Recent Results (from the past 24 hour(s))   CBC WITH AUTOMATED DIFF    Collection Time: 10/18/18  3:45 AM   Result Value Ref Range    WBC 10.6 4.3 - 11.1 K/uL    RBC 4.93 4.05 - 5.2 M/uL    HGB 13.3 11.7 - 15.4 g/dL    HCT 41.7 35.8 - 46.3 %    MCV 84.6 79.6 - 97.8 FL    MCH 27.0 26.1 - 32.9 PG    MCHC 31.9 31.4 - 35.0 g/dL    RDW 13.6 %    PLATELET 120 247 - 323 K/uL    MPV 10.6 9.4 - 12.3 FL    ABSOLUTE NRBC 0.00 0.0 - 0.2 K/uL    DF AUTOMATED      NEUTROPHILS 78 43 - 78 %    LYMPHOCYTES 13 13 - 44 %    MONOCYTES 7 4.0 - 12.0 %    EOSINOPHILS 1 0.5 - 7.8 %    BASOPHILS 0 0.0 - 2.0 %    IMMATURE GRANULOCYTES 0 0.0 - 5.0 %    ABS. NEUTROPHILS 8.3 (H) 1.7 - 8.2 K/UL    ABS. LYMPHOCYTES 1.4 0.5 - 4.6 K/UL    ABS. MONOCYTES 0.8 0.1 - 1.3 K/UL    ABS. EOSINOPHILS 0.2 0.0 - 0.8 K/UL    ABS. BASOPHILS 0.0 0.0 - 0.2 K/UL    ABS. IMM. GRANS. 0.0 0.0 - 0.5 K/UL   METABOLIC PANEL, COMPREHENSIVE    Collection Time: 10/18/18  3:45 AM   Result Value Ref Range    Sodium 136 136 - 145 mmol/L    Potassium 3.7 3.5 - 5.1 mmol/L    Chloride 102 98 - 107 mmol/L    CO2 29 21 - 32 mmol/L    Anion gap 5 mmol/L    Glucose 101 (H) 65 - 100 mg/dL    BUN 10 6 - 23 MG/DL    Creatinine 1.05 (H) 0.6 - 1.0 MG/DL    GFR est AA >60 >60 ml/min/1.73m2    GFR est non-AA >60 ml/min/1.73m2    Calcium 8.9 8.3 - 10.4 MG/DL    Bilirubin, total 2.5 (H) 0.2 - 1.1 MG/DL    ALT (SGPT) 178 (H) 12 - 65 U/L    AST (SGOT) 285 (H) 15 - 37 U/L    Alk. phosphatase 193 (H) 50 - 136 U/L    Protein, total 7.9 g/dL    Albumin 4.1 3.5 - 5.0 g/dL    Globulin 3.8 (H) 2.3 - 3.5 g/dL    A-G Ratio 1.1     LIPASE    Collection Time: 10/18/18  3:45 AM   Result Value Ref Range    Lipase 230 73 - 393 U/L   HCG URINE, QL. - POC    Collection Time: 10/18/18  3:57 AM   Result Value Ref Range    Pregnancy test,urine (POC) NEGATIVE  NEG     URINE MICROSCOPIC    Collection Time: 10/18/18  4:00 AM   Result Value Ref Range    WBC 20-50 0 /hpf    RBC 0-3 0 /hpf    Epithelial cells 3-5 0 /hpf    Bacteria TRACE 0 /hpf    Casts 0-3 0 /lpf     Imaging /Procedures /Studies     Assessment and Plan:   There are no active hospital problems to display for this patient. PLAN  ·  Admit to inpatient for acute cholecystitis and abdominal pain  · GI consulted, plan for ERCP today in [facility].   · NPO  · Protonix IV q12h  · Surgery consult, will need cholecystectomy as outpatient  · IV reglan for nausea/vomiting  · Prn tylenol for mild pain and fever  · Prn norco and IV dilaudid for moderate to severe pain  · Prn zofran for nausea/vomiting  · IV zosyn for empiric coverage for GNR infection  · IV fluids d5LR @ 125 cc/hr  · DVT prophylaxis with lovenox     Code Status: full  High risk with opioids on board     Anticipated discharge: >2 MN    ·        Signed By: Laura Jones MD     October 18, 2018

## 2018-10-18 NOTE — PROGRESS NOTES
CM met with pt at bedside. Pt lives with daughter and boyfriend. No stairs at entry. Pt reports being independent with ADLS-still drives. Pt employed and will need a work excuse at [employer]. No DME or home oxygen. Pt is listed as Self Pay but states that she has [employer] through employer. PCP request sent to [facility] response. No needs voiced at this time. Care Management Interventions Transition of Care Consult (CM Consult): Discharge Planning Current Support Network: Own Home Confirm Follow Up Transport: Family Plan discussed with Pt/Family/Caregiver: Yes Freedom of Choice Offered: Yes Discharge Location Discharge Placement: Home

## 2018-10-18 NOTE — DISCHARGE SUMMARY
Hospitalist Discharge Summary     Patient ID:  Manjeet Rios  [id number]  21 y.o.  1990  Admit date: 10/18/2018  7:59 AM  Discharge date and time: 10/18/2018  Attending: Julianna Ovalles MD  PCP:  Roque Jim NP  Treatment Team: Attending Provider: Julianna Ovalles MD; Care Manager: Francine Ivey [facility]; Consulting Provider: Rozanne Hamman, MD; Charge Nurse: Lang Welch RN    Principal Diagnosis <principal problem not specified>   Active Problems:    Abdominal pain (10/18/2018)             Hospital Course:  Please refer to the admission H&P for details of presentation. In summary, the patient is a 35yo AAF who presented with abdominal pain. She was found to have elevated LFTs and thickened gallbladder with stones and normal CBD on abd US. She was evaluated initially at [facility] and transferred downtown for GI and surgery evaluation for possible ERCP or dave. GI recommenced consideration of ERCP after cholecystectomy if positive interoperative cholangiogram.    Surgery evaluated with resolved symptoms and recommended outpatient follow-up to schedule cholecystectomy. The patient is asymptomatic and asking to be discharged home this evening. Significant Diagnostic Studies:       Labs: Results:       Chemistry Recent Labs     10/18/18  0345   *      K 3.7      CO2 29   BUN 10   CREA 1.05*   CA 8.9   AGAP 5   *   TP 7.9   ALB 4.1   GLOB 3.8*   AGRAT 1.1      CBC w/Diff Recent Labs     10/18/18  0345   WBC 10.6   RBC 4.93   HGB 13.3   HCT 41.7      GRANS 78   LYMPH 13   EOS 1      Cardiac Enzymes No results for input(s): CPK, CKND1, KATARINA in the last 72 hours. No lab exists for component: CKRMB, TROIP   Coagulation No results for input(s): PTP, INR, APTT in the last 72 hours.     No lab exists for component: INREXT    Lipid Panel No results found for: CHOL, CHOLPOCT, CHOLX, CHLST, CHOLV, 716700, HDL, LDL, LDLC, DLDLP, 959932, VLDLC, VLDL, TGLX, TRIGL, TRIGP, TGLPOCT, CHHD, CHHDX   BNP No results for input(s): BNPP in the last 72 hours. Liver Enzymes Recent Labs     10/18/18  0345   TP 7.9   ALB 4.1   *   SGOT 285*      Thyroid Studies No results found for: T4, T3U, TSH, TSHEXT         Discharge Exam:  Visit Vitals  /68 (BP 1 Location: Right arm, BP Patient Position: Sitting)   Pulse 99   Temp 98.2 °F (36.8 °C)   Resp 15   LMP 10/07/2018   SpO2 96%     General appearance: alert, cooperative, no distress, appears stated age   Lungs: clear to auscultation bilaterally  Heart: regular rate and rhythm, S1, S2 normal, no murmur, click, rub or gallop  Abdomen: soft, non-tender. Bowel sounds normal. No masses,  no organomegaly  Extremities: no cyanosis or edema  Neurologic: Grossly normal    Disposition: home  Discharge Condition: stable  Patient Instructions: Avoid fatty food, establish with PCP for follow-up, keep surgery appointment. Current Discharge Medication List      START taking these medications    Details   HYDROcodone-acetaminophen (NORCO) 7.5-325 mg per tablet Take 1 Tab by mouth every six (6) hours as needed. Max Daily Amount: 4 Tabs. Qty: 15 Tab, Refills: 0    Associated Diagnoses: Acute cholecystitis due to biliary calculus             Activity: Activity as tolerated  Diet: Low fat, Low cholesterol    Follow-up:     Follow-up Appointments   Procedures    FOLLOW UP VISIT Appointment in: Other (Specify) PCP in 1 week to recheck liver enzymes, surgery as scheduled     PCP in 1 week to recheck liver enzymes, surgery as scheduled     Standing Status:   Standing     Number of Occurrences:   1     Order Specific Question:   Appointment in     Answer:    Other (Specify)     10/25/18 @ 9:15am with Dr. Gisele Hillman           Time spent to discharge patient 35 minutes  Signed:  Dominick Coates MD  10/18/2018  7:49 PM

## 2018-10-19 NOTE — PROGRESS NOTES
Patient discharged home. IV taken out, Discharge paperwork signed and placed in chart. Prescription and AVS given to patient. All questions answered to patient's satisfaction. All belongings taken with patient. Patient aware of upcoming appointment. Patient leaving floor at 2032.

## 2018-10-19 NOTE — DISCHARGE INSTRUCTIONS
DISCHARGE SUMMARY from Nurse    PATIENT INSTRUCTIONS:    After general anesthesia or intravenous sedation, for 24 hours or while taking prescription Narcotics:  · Limit your activities  · Do not drive and operate hazardous machinery  · Do not make important personal or business decisions  · Do  not drink alcoholic beverages  · If you have not urinated within 8 hours after discharge, please contact your surgeon on call. Report the following to your surgeon:  · Excessive pain, swelling, redness or odor of or around the surgical area  · Temperature over 100.5  · Nausea and vomiting lasting longer than 4 hours or if unable to take medications  · Any signs of decreased circulation or nerve impairment to extremity: change in color, persistent  numbness, tingling, coldness or increase pain  · Any questions    What to do at Home:  Recommended activity: Activity as tolerated. *  Please give a list of your current medications to your Primary Care Provider. *  Please update this list whenever your medications are discontinued, doses are      changed, or new medications (including over-the-counter products) are added. *  Please carry medication information at all times in case of emergency situations. These are general instructions for a healthy lifestyle:    No smoking/ No tobacco products/ Avoid exposure to second hand smoke  Surgeon General's Warning:  Quitting smoking now greatly reduces serious risk to your health. Obesity, smoking, and sedentary lifestyle greatly increases your risk for illness    A healthy diet, regular physical exercise & weight monitoring are important for maintaining a healthy lifestyle    You may be retaining fluid if you have a history of heart failure or if you experience any of the following symptoms:  Weight gain of 3 pounds or more overnight or 5 pounds in a week, increased swelling in our hands or feet or shortness of breath while lying flat in bed.   Please call your doctor as soon as you notice any of these symptoms; do not wait until your next office visit. Recognize signs and symptoms of STROKE:    F-face looks uneven    A-arms unable to move or move unevenly    S-speech slurred or non-existent    T-time-call 911 as soon as signs and symptoms begin-DO NOT go       Back to bed or wait to see if you get better-TIME IS BRAIN. Warning Signs of HEART ATTACK     Call 911 if you have these symptoms:   Chest discomfort. Most heart attacks involve discomfort in the center of the chest that lasts more than a few minutes, or that goes away and comes back. It can feel like uncomfortable pressure, squeezing, fullness, or pain.  Discomfort in other areas of the upper body. Symptoms can include pain or discomfort in one or both arms, the back, neck, jaw, or stomach.  Shortness of breath with or without chest discomfort.  Other signs may include breaking out in a cold sweat, nausea, or lightheadedness. Don't wait more than five minutes to call 911 - MINUTES MATTER! Fast action can save your life. Calling 911 is almost always the fastest way to get lifesaving treatment. Emergency Medical Services staff can begin treatment when they arrive -- up to an hour sooner than if someone gets to the hospital by car. The discharge information has been reviewed with the patient. The patient verbalized understanding. Discharge medications reviewed with the patient and appropriate educational materials and side effects teaching were provided. ___________________________________________________________________________________________________________________________________Follow up with surgeon Dr. Elizabeth Shah on 10/25/18 @ 9:15am at [facility] office to schedule cholecystectomy.

## 2022-01-28 ENCOUNTER — HOSPITAL ENCOUNTER (EMERGENCY)
Age: 32
Discharge: HOME | End: 2022-01-28
Attending: EMERGENCY MEDICINE
Payer: COMMERCIAL

## 2022-01-28 ENCOUNTER — APPOINTMENT (OUTPATIENT)
Dept: GENERAL RADIOLOGY | Age: 32
End: 2022-01-28
Attending: EMERGENCY MEDICINE
Payer: COMMERCIAL

## 2022-01-28 VITALS
RESPIRATION RATE: 16 BRPM | BODY MASS INDEX: 26.68 KG/M2 | DIASTOLIC BLOOD PRESSURE: 73 MMHG | HEART RATE: 64 BPM | OXYGEN SATURATION: 99 % | TEMPERATURE: 98.4 F | HEIGHT: 67 IN | WEIGHT: 170 LBS | SYSTOLIC BLOOD PRESSURE: 133 MMHG

## 2022-01-28 DIAGNOSIS — R07.89 CHEST WALL PAIN: Primary | ICD-10-CM

## 2022-01-28 LAB
ALBUMIN SERPL-MCNC: 3.5 G/DL (ref 3.5–5)
ALBUMIN/GLOB SERPL: 0.9 {RATIO} (ref 1.2–3.5)
ALP SERPL-CCNC: 83 U/L (ref 50–136)
ALT SERPL-CCNC: 32 U/L (ref 12–65)
ANION GAP SERPL CALC-SCNC: 4 MMOL/L (ref 7–16)
AST SERPL-CCNC: 12 U/L (ref 15–37)
ATRIAL RATE: 63 BPM
BASOPHILS # BLD: 0.1 K/UL (ref 0–0.2)
BASOPHILS NFR BLD: 1 % (ref 0–2)
BILIRUB SERPL-MCNC: 0.2 MG/DL (ref 0.2–1.1)
BUN SERPL-MCNC: 10 MG/DL (ref 6–23)
CALCIUM SERPL-MCNC: 9 MG/DL (ref 8.3–10.4)
CALCULATED P AXIS, ECG09: 67 DEGREES
CALCULATED R AXIS, ECG10: 71 DEGREES
CALCULATED T AXIS, ECG11: 58 DEGREES
CHLORIDE SERPL-SCNC: 104 MMOL/L (ref 98–107)
CO2 SERPL-SCNC: 29 MMOL/L (ref 21–32)
COVID-19 RAPID TEST, COVR: NOT DETECTED
CREAT SERPL-MCNC: 0.84 MG/DL (ref 0.6–1)
DIAGNOSIS, 93000: NORMAL
DIFFERENTIAL METHOD BLD: ABNORMAL
EOSINOPHIL # BLD: 1.1 K/UL (ref 0–0.8)
EOSINOPHIL NFR BLD: 9 % (ref 0.5–7.8)
ERYTHROCYTE [DISTWIDTH] IN BLOOD BY AUTOMATED COUNT: 13.5 % (ref 11.9–14.6)
GLOBULIN SER CALC-MCNC: 4 G/DL (ref 2.3–3.5)
GLUCOSE SERPL-MCNC: 97 MG/DL (ref 65–100)
HCT VFR BLD AUTO: 37.3 % (ref 35.8–46.3)
HGB BLD-MCNC: 11.8 G/DL (ref 11.7–15.4)
IMM GRANULOCYTES # BLD AUTO: 0 K/UL (ref 0–0.5)
IMM GRANULOCYTES NFR BLD AUTO: 0 % (ref 0–5)
LIPASE SERPL-CCNC: 184 U/L (ref 73–393)
LYMPHOCYTES # BLD: 2.9 K/UL (ref 0.5–4.6)
LYMPHOCYTES NFR BLD: 24 % (ref 13–44)
MAGNESIUM SERPL-MCNC: 2.1 MG/DL (ref 1.8–2.4)
MCH RBC QN AUTO: 26.8 PG (ref 26.1–32.9)
MCHC RBC AUTO-ENTMCNC: 31.6 G/DL (ref 31.4–35)
MCV RBC AUTO: 84.6 FL (ref 79.6–97.8)
MONOCYTES # BLD: 0.8 K/UL (ref 0.1–1.3)
MONOCYTES NFR BLD: 7 % (ref 4–12)
NEUTS SEG # BLD: 7 K/UL (ref 1.7–8.2)
NEUTS SEG NFR BLD: 58 % (ref 43–78)
NRBC # BLD: 0 K/UL (ref 0–0.2)
P-R INTERVAL, ECG05: 180 MS
PLATELET # BLD AUTO: 272 K/UL (ref 150–450)
PMV BLD AUTO: 10.4 FL (ref 9.4–12.3)
POTASSIUM SERPL-SCNC: 4 MMOL/L (ref 3.5–5.1)
PROT SERPL-MCNC: 7.5 G/DL (ref 6.3–8.2)
Q-T INTERVAL, ECG07: 396 MS
QRS DURATION, ECG06: 94 MS
QTC CALCULATION (BEZET), ECG08: 405 MS
RBC # BLD AUTO: 4.41 M/UL (ref 4.05–5.2)
SODIUM SERPL-SCNC: 137 MMOL/L (ref 136–145)
SOURCE, COVRS: NORMAL
TROPONIN-HIGH SENSITIVITY: 6.5 PG/ML (ref 0–14)
TROPONIN-HIGH SENSITIVITY: 7.1 PG/ML (ref 0–14)
VENTRICULAR RATE, ECG03: 63 BPM
WBC # BLD AUTO: 12 K/UL (ref 4.3–11.1)

## 2022-01-28 PROCEDURE — 93005 ELECTROCARDIOGRAM TRACING: CPT | Performed by: EMERGENCY MEDICINE

## 2022-01-28 PROCEDURE — 85025 COMPLETE CBC W/AUTO DIFF WBC: CPT

## 2022-01-28 PROCEDURE — 84484 ASSAY OF TROPONIN QUANT: CPT

## 2022-01-28 PROCEDURE — 83690 ASSAY OF LIPASE: CPT

## 2022-01-28 PROCEDURE — 96374 THER/PROPH/DIAG INJ IV PUSH: CPT

## 2022-01-28 PROCEDURE — 74011250636 HC RX REV CODE- 250/636: Performed by: EMERGENCY MEDICINE

## 2022-01-28 PROCEDURE — 80053 COMPREHEN METABOLIC PANEL: CPT

## 2022-01-28 PROCEDURE — 83735 ASSAY OF MAGNESIUM: CPT

## 2022-01-28 PROCEDURE — 99282 EMERGENCY DEPT VISIT SF MDM: CPT

## 2022-01-28 PROCEDURE — 71045 X-RAY EXAM CHEST 1 VIEW: CPT

## 2022-01-28 PROCEDURE — 87635 SARS-COV-2 COVID-19 AMP PRB: CPT

## 2022-01-28 RX ORDER — SODIUM CHLORIDE 0.9 % (FLUSH) 0.9 %
5-10 SYRINGE (ML) INJECTION AS NEEDED
Status: DISCONTINUED | OUTPATIENT
Start: 2022-01-28 | End: 2022-01-28 | Stop reason: HOSPADM

## 2022-01-28 RX ORDER — SODIUM CHLORIDE 0.9 % (FLUSH) 0.9 %
5-10 SYRINGE (ML) INJECTION EVERY 8 HOURS
Status: DISCONTINUED | OUTPATIENT
Start: 2022-01-28 | End: 2022-01-28 | Stop reason: HOSPADM

## 2022-01-28 RX ORDER — KETOROLAC TROMETHAMINE 10 MG/1
10 TABLET, FILM COATED ORAL
Qty: 15 TABLET | Refills: 0 | Status: SHIPPED | OUTPATIENT
Start: 2022-01-28

## 2022-01-28 RX ORDER — KETOROLAC TROMETHAMINE 30 MG/ML
30 INJECTION, SOLUTION INTRAMUSCULAR; INTRAVENOUS
Status: COMPLETED | OUTPATIENT
Start: 2022-01-28 | End: 2022-01-28

## 2022-01-28 RX ADMIN — KETOROLAC TROMETHAMINE 30 MG: 30 INJECTION, SOLUTION INTRAMUSCULAR at 03:43

## 2022-01-28 RX ADMIN — SODIUM CHLORIDE 1000 ML: 900 INJECTION, SOLUTION INTRAVENOUS at 03:40

## 2022-01-28 NOTE — ED TRIAGE NOTES
Pt has been sick with a cold for about 2 weeks and has a cough/ pt is vaccinated against covid but works with the public/ pt states chest pain is under her left breast and goes down arm

## 2022-01-28 NOTE — ED NOTES
I have reviewed discharge instructions with the patient. The patient verbalized understanding. Patient left ED via Discharge Method: ambulatory to Home with self. Opportunity for questions and clarification provided. Patient given 1 scripts. To continue your aftercare when you leave the hospital, you may receive an automated call from our care team to check in on how you are doing. This is a free service and part of our promise to provide the best care and service to meet your aftercare needs.  If you have questions, or wish to unsubscribe from this service please call [phone]. Thank you for Choosing our [facility] Emergency Department.

## 2022-01-28 NOTE — ED PROVIDER NOTES
Patient has had nasal congestion for the past week. No shortness of breath. No cough. Does have some sharp left lower chest pain underneath her breast.  Worse with deep inspiration. Some mild soreness in her left shoulder. No numbness or diaphoresis. Pain is worse at night when she lays down to go to sleep. Worse with certain positions. Treated with Motrin. The history is provided by the patient. No  was used. Nasal Congestion  This is a new problem. The current episode started more than 2 days ago. The problem occurs constantly. The problem has not changed since onset. Associated symptoms include chest pain. Pertinent negatives include no abdominal pain, no headaches and no shortness of breath. Nothing aggravates the symptoms. Nothing relieves the symptoms. She has tried nothing for the symptoms. Chest Pain   Pertinent negatives include no abdominal pain, no back pain, no fever, no headaches, no nausea, no numbness, no shortness of breath, no vomiting and no weakness. History reviewed. No pertinent past medical history. No past surgical history on file. History reviewed. No pertinent family history. Social History     Socioeconomic History    Marital status: SINGLE     Spouse name: Not on file    Number of children: Not on file    Years of education: Not on file    Highest education level: Not on file   Occupational History    Not on file   Tobacco Use    Smoking status: Not on file    Smokeless tobacco: Not on file   Substance and Sexual Activity    Alcohol use:  Yes    Drug use: Not on file    Sexual activity: Not on file   Other Topics Concern    Not on file   Social History Narrative    Not on file     Social Determinants of Health     Financial Resource Strain:     Difficulty of Paying Living Expenses: Not on file   Food Insecurity:     Worried About Running Out of Food in the Last Year: Not on file    Ran Out of Food in the Last Year: Not on file   Transportation Needs:     Lack of Transportation (Medical): Not on file    Lack of Transportation (Non-Medical): Not on file   Physical Activity:     Days of Exercise per Week: Not on file    Minutes of Exercise per Session: Not on file   Stress:     Feeling of Stress : Not on file   Social Connections:     Frequency of Communication with Friends and Family: Not on file    Frequency of Social Gatherings with Friends and Family: Not on file    Attends Christian Services: Not on file    Active Member of [address] [place] [address] [employer] or Organizations: Not on file    Attends Club or Organization Meetings: Not on file    Marital Status: Not on file   Intimate Partner Violence:     Fear of Current or Ex-Partner: Not on file    Emotionally Abused: Not on file    Physically Abused: Not on file    Sexually Abused: Not on file   Housing Stability:     Unable to Pay for Housing in the Last Year: Not on file    Number of [place] in the Last Year: Not on file    Unstable Housing in the Last Year: Not on file         ALLERGIES: Patient has no known allergies. Review of Systems   Constitutional: Negative for chills and fever. HENT: Positive for congestion and rhinorrhea. Negative for sore throat. Eyes: Negative for pain and redness. Respiratory: Negative for chest tightness, shortness of breath and wheezing. Cardiovascular: Positive for chest pain. Negative for leg swelling. Gastrointestinal: Negative for abdominal pain, diarrhea, nausea and vomiting. Genitourinary: Negative for dysuria and hematuria. Musculoskeletal: Negative for back pain, gait problem, neck pain and neck stiffness. Skin: Negative for color change and rash. Neurological: Negative for weakness, numbness and headaches.        Vitals:    01/28/22 0226   BP: 133/73   Pulse: 64   Resp: 16   Temp: 98.4 °F (36.9 °C)   SpO2: 99%   Weight: 77.1 kg (170 lb)   Height: 5' 7\" (1.702 m)            Physical Exam  Constitutional:       Appearance: Normal appearance. She is well-developed. HENT:      Head: Normocephalic and atraumatic. Cardiovascular:      Rate and Rhythm: Normal rate and regular rhythm. Pulmonary:      Effort: Pulmonary effort is normal.      Breath sounds: Normal breath sounds. Chest:      Chest wall: Tenderness (minimal left chest) present. Abdominal:      General: Bowel sounds are normal.      Palpations: Abdomen is soft. Tenderness: There is no abdominal tenderness. Musculoskeletal:         General: No swelling. Normal range of motion. Cervical back: Normal range of motion and neck supple. Skin:     General: Skin is warm and dry. Neurological:      Mental Status: She is alert and oriented to person, place, and time. MDM  Number of Diagnoses or Management Options  Diagnosis management comments: Patient with chest wall pain. Improved with Toradol. Will discharge with continued treatment at home. Amount and/or Complexity of Data Reviewed  Clinical lab tests: ordered and reviewed  Tests in the radiology section of CPT®: ordered and reviewed  Tests in the medicine section of CPT®: ordered and reviewed    Patient Progress  Patient progress: stable         Procedures        EKG: normal sinus rhythm, nonspecific ST and T waves changes. Rate 63. XR CHEST PORT (Final result)  Result time 01/28/22 03:10:33  Final result by Susy Gray MD (01/28/22 03:10:33)                Impression:    No evidence of an acute intrathoracic process. Narrative:    EXAM: XR CHEST PORT     HISTORY: cp.       TECHNIQUE: Frontal chest.     COMPARISON: None available. FINDINGS:   The cardiac silhouette, mediastinum, and pulmonary vasculature are within normal   limits. There is no consolidation, pleural effusion, or pneumothorax.      No significant osseous abnormalities are observed.                   Results Include:    Recent Results (from the past 24 hour(s))   TROPONIN-HIGH SENSITIVITY Collection Time: 01/28/22  2:41 AM   Result Value Ref Range    Troponin-High Sensitivity 6.5 0 - 14 pg/mL   CBC WITH AUTOMATED DIFF    Collection Time: 01/28/22  2:41 AM   Result Value Ref Range    WBC 12.0 (H) 4.3 - 11.1 K/uL    RBC 4.41 4.05 - 5.2 M/uL    HGB 11.8 11.7 - 15.4 g/dL    HCT 37.3 35.8 - 46.3 %    MCV 84.6 79.6 - 97.8 FL    MCH 26.8 26.1 - 32.9 PG    MCHC 31.6 31.4 - 35.0 g/dL    RDW 13.5 11.9 - 14.6 %    PLATELET 299 737 - 882 K/uL    MPV 10.4 9.4 - 12.3 FL    ABSOLUTE NRBC 0.00 0.0 - 0.2 K/uL    DF AUTOMATED      NEUTROPHILS 58 43 - 78 %    LYMPHOCYTES 24 13 - 44 %    MONOCYTES 7 4.0 - 12.0 %    EOSINOPHILS 9 (H) 0.5 - 7.8 %    BASOPHILS 1 0.0 - 2.0 %    IMMATURE GRANULOCYTES 0 0.0 - 5.0 %    ABS. NEUTROPHILS 7.0 1.7 - 8.2 K/UL    ABS. LYMPHOCYTES 2.9 0.5 - 4.6 K/UL    ABS. MONOCYTES 0.8 0.1 - 1.3 K/UL    ABS. EOSINOPHILS 1.1 (H) 0.0 - 0.8 K/UL    ABS. BASOPHILS 0.1 0.0 - 0.2 K/UL    ABS. IMM. GRANS. 0.0 0.0 - 0.5 K/UL   METABOLIC PANEL, COMPREHENSIVE    Collection Time: 01/28/22  2:41 AM   Result Value Ref Range    Sodium 137 136 - 145 mmol/L    Potassium 4.0 3.5 - 5.1 mmol/L    Chloride 104 98 - 107 mmol/L    CO2 29 21 - 32 mmol/L    Anion gap 4 (L) 7 - 16 mmol/L    Glucose 97 65 - 100 mg/dL    BUN 10 6 - 23 MG/DL    Creatinine 0.84 0.6 - 1.0 MG/DL    GFR est AA >60 >60 ml/min/1.73m2    GFR est non-AA >60 >60 ml/min/1.73m2    Calcium 9.0 8.3 - 10.4 MG/DL    Bilirubin, total 0.2 0.2 - 1.1 MG/DL    ALT (SGPT) 32 12 - 65 U/L    AST (SGOT) 12 (L) 15 - 37 U/L    Alk.  phosphatase 83 50 - 136 U/L    Protein, total 7.5 6.3 - 8.2 g/dL    Albumin 3.5 3.5 - 5.0 g/dL    Globulin 4.0 (H) 2.3 - 3.5 g/dL    A-G Ratio 0.9 (L) 1.2 - 3.5     LIPASE    Collection Time: 01/28/22  2:41 AM   Result Value Ref Range    Lipase 184 73 - 393 U/L   MAGNESIUM    Collection Time: 01/28/22  2:41 AM   Result Value Ref Range    Magnesium 2.1 1.8 - 2.4 mg/dL   COVID-19 RAPID TEST    Collection Time: 01/28/22  2:41 AM   Result Value Ref Range    Specimen source NASAL SWAB      COVID-19 rapid test Not detected NOTD     TROPONIN-HIGH SENSITIVITY    Collection Time: 01/28/22  4:26 AM   Result Value Ref Range    Troponin-High Sensitivity 7.1 0 - 14 pg/mL